# Patient Record
Sex: MALE | URBAN - METROPOLITAN AREA
[De-identification: names, ages, dates, MRNs, and addresses within clinical notes are randomized per-mention and may not be internally consistent; named-entity substitution may affect disease eponyms.]

---

## 2019-10-10 ENCOUNTER — APPOINTMENT (OUTPATIENT)
Dept: URBAN - METROPOLITAN AREA CLINIC 293 | Age: 29
Setting detail: DERMATOLOGY
End: 2019-10-10

## 2019-10-10 DIAGNOSIS — L40.0 PSORIASIS VULGARIS: ICD-10-CM

## 2019-10-10 DIAGNOSIS — L29.8 OTHER PRURITUS: ICD-10-CM

## 2019-10-10 PROBLEM — L29.9 PRURITUS, UNSPECIFIED: Status: ACTIVE | Noted: 2019-10-10

## 2019-10-10 PROCEDURE — OTHER ORDER TESTS: OTHER

## 2019-10-10 PROCEDURE — OTHER ADDITIONAL NOTES: OTHER

## 2019-10-10 PROCEDURE — 99202 OFFICE O/P NEW SF 15 MIN: CPT

## 2019-10-10 PROCEDURE — OTHER PRESCRIPTION: OTHER

## 2019-10-10 PROCEDURE — OTHER MEDICATION COUNSELING: OTHER

## 2019-10-10 PROCEDURE — OTHER COUNSELING: OTHER

## 2019-10-10 RX ORDER — FOLIC ACID 1 MG
TABLET ORAL QD
Qty: 30 | Refills: 5 | COMMUNITY
Start: 2019-10-10

## 2019-10-10 RX ORDER — METHOTREXATE SODIUM 2.5 MG/1
TABLET ORAL
Qty: 16 | Refills: 3 | COMMUNITY
Start: 2019-10-10

## 2019-10-10 RX ORDER — TRIAMCINOLONE ACETONIDE 1 MG/G
CREAM TOPICAL BID
Qty: 1 | Refills: 3 | COMMUNITY
Start: 2019-10-10

## 2019-10-10 RX ORDER — CALCITRIOL 3 UG/G
OINTMENT TOPICAL BID
Qty: 1 | Refills: 5 | COMMUNITY
Start: 2019-10-10

## 2019-10-10 RX ORDER — CLOBETASOL PROPIONATE 0.46 MG/ML
SOLUTION TOPICAL QD
Qty: 1 | Refills: 3 | COMMUNITY
Start: 2019-10-10

## 2019-10-10 ASSESSMENT — LOCATION DETAILED DESCRIPTION DERM
LOCATION DETAILED: RIGHT PROXIMAL PRETIBIAL REGION
LOCATION DETAILED: RIGHT PROXIMAL DORSAL FOREARM
LOCATION DETAILED: LEFT ELBOW
LOCATION DETAILED: LEFT DISTAL PRETIBIAL REGION

## 2019-10-10 ASSESSMENT — LOCATION SIMPLE DESCRIPTION DERM
LOCATION SIMPLE: LEFT ELBOW
LOCATION SIMPLE: RIGHT PRETIBIAL REGION
LOCATION SIMPLE: LEFT PRETIBIAL REGION
LOCATION SIMPLE: RIGHT FOREARM

## 2019-10-10 ASSESSMENT — LOCATION ZONE DERM
LOCATION ZONE: ARM
LOCATION ZONE: LEG

## 2019-10-10 NOTE — PROCEDURE: MEDICATION COUNSELING
Xelenedinaz Pregnancy And Lactation Text: This medication is Pregnancy Category D and is not considered safe during pregnancy.  The risk during breast feeding is also uncertain.

## 2020-02-26 ENCOUNTER — APPOINTMENT (OUTPATIENT)
Dept: URBAN - METROPOLITAN AREA CLINIC 293 | Age: 30
Setting detail: DERMATOLOGY
End: 2020-02-27

## 2020-02-26 DIAGNOSIS — L29.8 OTHER PRURITUS: ICD-10-CM

## 2020-02-26 DIAGNOSIS — L40.0 PSORIASIS VULGARIS: ICD-10-CM

## 2020-02-26 PROCEDURE — OTHER ADDITIONAL NOTES: OTHER

## 2020-02-26 PROCEDURE — OTHER ORDER TESTS: OTHER

## 2020-02-26 PROCEDURE — OTHER PRESCRIPTION: OTHER

## 2020-02-26 PROCEDURE — OTHER PRESCRIPTION MEDICATION MANAGEMENT: OTHER

## 2020-02-26 PROCEDURE — 99214 OFFICE O/P EST MOD 30 MIN: CPT

## 2020-02-26 PROCEDURE — OTHER COUNSELING: OTHER

## 2020-02-26 RX ORDER — COAL TAR 25 MG/ML
SHAMPOO TOPICAL
Qty: 1 | Refills: 2 | COMMUNITY
Start: 2020-02-26

## 2020-02-26 RX ORDER — PIMECROLIMUS 10 MG/G
CREAM TOPICAL
Qty: 1 | Refills: 2 | COMMUNITY
Start: 2020-02-26

## 2020-02-26 NOTE — PROCEDURE: PRESCRIPTION MEDICATION MANAGEMENT
Render In Strict Bullet Format?: No
Continue Regimen: Methotrexate, folic acid, Triamcinolone prn flares, vectical, Clobetasol scalp solution prn flares
Detail Level: Generalized

## 2022-10-08 NOTE — PROCEDURE: MEDICATION COUNSELING
PT INSTRUCTE NOT TO TAKE OWN MEDS RN WOULD AMINISTER . PT VERBALIZED UNERSTANING/bedside Siliq Counseling:  I discussed with the patient the risks of Siliq including but not limited to new or worsening depression, suicidal thoughts and behavior, immunosuppression, malignancy, posterior leukoencephalopathy syndrome, and serious infections.  The patient understands that monitoring is required including a PPD at baseline and must alert us or the primary physician if symptoms of infection or other concerning signs are noted. There is also a special program designed to monitor depression which is required with Siliq.